# Patient Record
Sex: FEMALE | Race: WHITE | NOT HISPANIC OR LATINO | URBAN - METROPOLITAN AREA
[De-identification: names, ages, dates, MRNs, and addresses within clinical notes are randomized per-mention and may not be internally consistent; named-entity substitution may affect disease eponyms.]

---

## 2023-04-25 ENCOUNTER — TELEHEALTH PROVIDED IN PATIENT'S HOME (OUTPATIENT)
Dept: URBAN - METROPOLITAN AREA TELEHEALTH 3 | Facility: TELEHEALTH | Age: 43
End: 2023-04-25

## 2023-04-25 VITALS — WEIGHT: 139 LBS | HEIGHT: 66 IN

## 2023-04-25 DIAGNOSIS — R93.5 ABNORMAL FINDINGS ON DIAGNOSTIC IMAGING OF OTHER ABDOMINAL R: ICD-10-CM

## 2023-04-25 PROCEDURE — 99204 OFFICE O/P NEW MOD 45 MIN: CPT | Mod: 95 | Performed by: INTERNAL MEDICINE

## 2023-04-25 NOTE — SERVICEHPINOTES
PATIENT VERIFIED BY DATE OF BIRTH AND NAME. Patient has been consented for this telecommunication visit.
bruno carr Ms. Mejia is a 44 yo female here for consultation regarding abnormal imaging of liver.  She is a  in [facility]. She presented to [facility] few weeks back w/ sudden chest and arm pain.  She had negative ECG and troponin and unremarkable blood work.  The pain resolved and she was discharged home in stable condition.  She was seen by [facility] and r/o for any cardiac events via echo.  She had CT imaging which r/o PE, but found to have incidental "subtle 1.7 cm hypodense lesion in posterior right hepatic lobe."  We DO NOT have this formal report to review today (we have contacted [facility] numerous times in the past week) but they have not faxed this study to us.
bruno carr She feels well from a GI standpoint today.  She has no GERD, no dysphagia, no abdominal pain, no rectal bleeding, jaundice, malaise, weight loss.  She has a regular BM daily, with no constipation or diarrhea.  She has a good appetite.  No hx of ETOH abuse or known viral hepatitis.
bruno Aguirre 10 point review of systems have been reviewed as per HPI and otherwise negative. span id="{[id number]}" class="narrative freetextSelected" type="freetext" canedit="true" suppressed="false" nid="[id number]" gid="{[id number]}" bound="false" visited="true" bruno carr /spanspan id="{[id number]}" class="narrative placeholderNormal" type="placeholder" canedit="true" suppressed="false" nid="[id number]" gid="{[id number]}" propertyname="rosWording" displayname="ROS Wording" tooltip="" handler="" handlerdata="" datatype="text" mandatory="false" requires="" allowmultipleentries="" describes="" tagname="" prototype="" dontshowempty="false" empty="true" onmouseover="__NarrativeOnMouseOver('{[id number]}')" onmouseout="__NarrativeOnMouseOut('{[id number]}')" onmousedown="__NarrativePlaceholderClicked('{[id number]}')"[ROS Wording]/spanspan id="{[id number]}" class="narrative freetextNormal" type="freetext" canedit="true" suppressed="false" nid="[id number]" gid="{[id number]}" bound="false" /span